# Patient Record
Sex: FEMALE | Race: WHITE | Employment: FULL TIME | URBAN - METROPOLITAN AREA
[De-identification: names, ages, dates, MRNs, and addresses within clinical notes are randomized per-mention and may not be internally consistent; named-entity substitution may affect disease eponyms.]

---

## 2022-10-31 ENCOUNTER — HOSPITAL ENCOUNTER (EMERGENCY)
Dept: GENERAL RADIOLOGY | Age: 67
Discharge: HOME | End: 2022-11-03
Payer: MEDICARE

## 2022-10-31 ENCOUNTER — HOSPITAL ENCOUNTER (EMERGENCY)
Age: 67
Discharge: HOME | End: 2022-10-31
Attending: EMERGENCY MEDICINE
Payer: MEDICARE

## 2022-10-31 VITALS
HEIGHT: 67 IN | DIASTOLIC BLOOD PRESSURE: 98 MMHG | SYSTOLIC BLOOD PRESSURE: 170 MMHG | OXYGEN SATURATION: 100 % | TEMPERATURE: 99.2 F | HEART RATE: 93 BPM | BODY MASS INDEX: 37.35 KG/M2 | WEIGHT: 238 LBS | RESPIRATION RATE: 18 BRPM

## 2022-10-31 DIAGNOSIS — S89.91XA RIGHT KNEE INJURY, INITIAL ENCOUNTER: Primary | ICD-10-CM

## 2022-10-31 PROCEDURE — 73562 X-RAY EXAM OF KNEE 3: CPT

## 2022-10-31 PROCEDURE — 99283 EMERGENCY DEPT VISIT LOW MDM: CPT

## 2022-10-31 ASSESSMENT — PAIN SCALES - GENERAL: PAINLEVEL_OUTOF10: 5

## 2022-10-31 ASSESSMENT — PAIN - FUNCTIONAL ASSESSMENT: PAIN_FUNCTIONAL_ASSESSMENT: 0-10

## 2022-10-31 NOTE — ED PROVIDER NOTES
Emergency Department Provider Note                   PCP:                None Provider               Age: 78 y.o. Sex: female       ICD-10-CM    1. Right knee injury, initial encounter  S89. 91XA           DISPOSITION Decision To Discharge 10/31/2022 03:08:34 PM       MDM  Number of Diagnoses or Management Options  Right knee injury, initial encounter: new, needed workup  Diagnosis management comments: Right knee pain 2 weeks after a twisting injury  Xray right knee with degenerative changes - I viewed images  Knee immobilizer  Ibuprofen or Tylenol as needed  Follow up with Orthopedics for further evaluation  Return with new or worse symptoms. Amount and/or Complexity of Data Reviewed  Tests in the radiology section of CPT®: ordered and reviewed               Orders Placed This Encounter   Procedures    XR KNEE RIGHT (3 VIEWS)    [facility] Knee Immobilizer, Right; 14\"        Medications - No data to display    New Prescriptions    No medications on file        Adrianna Dudley is a 78 y.o. female who presents to the Emergency Department with chief complaint of    Chief Complaint   Patient presents with    Knee Pain      61-year-old female complains of right knee pain. She states that she twisted the right knee while walking 2 weeks ago. She did not fall down. She has been treating it with ibuprofen and Tylenol. She has had no fever. No prior history of knee problems. She has been using heat and cold on the knee. She states that she did feel a pop. Pain became worse today. She is having to hold onto things as she ambulates secondary to the discomfort and feeling like it might be unstable. All other systems reviewed and are negative unless otherwise stated in the history of present illness section. Review of Systems   Constitutional:  Negative for chills and fever. Cardiovascular:  Positive for leg swelling.    Musculoskeletal:  Positive for arthralgias, gait problem and joint swelling. Skin: Negative. Neurological:  Negative for weakness and numbness. No past medical history on file. No past surgical history on file. No family history on file. Social History     Socioeconomic History    Marital status:         Allergies: Patient has no known allergies. Previous Medications    No medications on file        Vitals signs and nursing note reviewed. Patient Vitals for the past 4 hrs:   Temp Pulse Resp BP SpO2   10/31/22 1404 -- -- 18 (!) 190/89 100 %   10/31/22 1403 99.2 °F (37.3 °C) 93 16 -- --          Physical Exam  Constitutional:       Appearance: She is obese. Musculoskeletal:      Comments: Right knee with no redness or swelling. No instability. Tenderness to palpation with greatest area of tenderness at the lateral joint line. No increased warmth to touch. NV intact. Skin:     General: Skin is warm and dry. Neurological:      General: No focal deficit present. Mental Status: She is alert and oriented to person, place, and time. Psychiatric:         Mood and Affect: Mood normal.        Procedures    Results for orders placed or performed during the hospital encounter of 10/31/22   XR KNEE RIGHT (3 VIEWS)    Narrative    Right Knee    INDICATION: Right knee pain. Three views of the right knee were obtained    FINDINGS:  No evidence of acute fracture or dislocation. Chondrocalcinosis  involving the medial and lateral meniscus is noted. Severe patellofemoral joint  space narrowing with osteophytes is noted. Mild medial and lateral joint  compartment narrowing is also noted. Impression    No evidence of acute fracture or dislocation. Degenerative right  knee joint changes with the worst involving the patellofemoral joint space. XR KNEE RIGHT (3 VIEWS)   Final Result   No evidence of acute fracture or dislocation. Degenerative right   knee joint changes with the worst involving the patellofemoral joint space. Voice dictation software was used during the making of this note. This software is not perfect and grammatical and other typographical errors may be present. This note has not been completely proofread for errors.      Lara Augustine MD  10/31/22 3229

## 2022-10-31 NOTE — ED TRIAGE NOTES
Pt arrives in [facility] stating right knee pain x 2 weeks . Pt states she heard a \"pop\" and is unable to bear weight on that right knee.

## 2022-10-31 NOTE — ED NOTES
I have reviewed discharge instructions with the patient. The patient verbalized understanding. Patient left ED via Discharge Method: wheelchair to Home with self. Opportunity for questions and clarification provided. Patient given 0 scripts. To continue your aftercare when you leave the hospital, you may receive an automated call from our care team to check in on how you are doing. This is a free service and part of our promise to provide the best care and service to meet your aftercare needs.  If you have questions, or wish to unsubscribe from this service please call [phone]. Thank you for Choosing our [facility] Emergency Department.         Villa Harris RN  10/31/22 1614

## 2022-10-31 NOTE — DISCHARGE INSTRUCTIONS
You may take up to 800 mg Ibuprofen three times a day as needed for pain. You may take up to 1000 mg Acetaminophen four times a day as needed for pain. May use ice or heat as needed. Wear knee immobilizer while walking. Follow up with Orthopedic surgery for further evaluation.
WDL

## 2022-10-31 NOTE — ED NOTES
Did not have 14\" knee immobilizer in stock.  MD okayed 16\" immobilizer      Kenton Zimmerman RN  10/31/22 5905

## 2022-11-07 ENCOUNTER — OFFICE VISIT (OUTPATIENT)
Dept: ORTHOPEDIC SURGERY | Age: 67
End: 2022-11-07
Payer: MEDICARE

## 2022-11-07 DIAGNOSIS — M17.11 PRIMARY OSTEOARTHRITIS OF RIGHT KNEE: Primary | ICD-10-CM

## 2022-11-07 PROCEDURE — G8400 PT W/DXA NO RESULTS DOC: HCPCS | Performed by: ORTHOPAEDIC SURGERY

## 2022-11-07 PROCEDURE — G8484 FLU IMMUNIZE NO ADMIN: HCPCS | Performed by: ORTHOPAEDIC SURGERY

## 2022-11-07 PROCEDURE — 1090F PRES/ABSN URINE INCON ASSESS: CPT | Performed by: ORTHOPAEDIC SURGERY

## 2022-11-07 PROCEDURE — 4004F PT TOBACCO SCREEN RCVD TLK: CPT | Performed by: ORTHOPAEDIC SURGERY

## 2022-11-07 PROCEDURE — 3017F COLORECTAL CA SCREEN DOC REV: CPT | Performed by: ORTHOPAEDIC SURGERY

## 2022-11-07 PROCEDURE — 99204 OFFICE O/P NEW MOD 45 MIN: CPT | Performed by: ORTHOPAEDIC SURGERY

## 2022-11-07 PROCEDURE — 20610 DRAIN/INJ JOINT/BURSA W/O US: CPT | Performed by: ORTHOPAEDIC SURGERY

## 2022-11-07 PROCEDURE — 1123F ACP DISCUSS/DSCN MKR DOCD: CPT | Performed by: ORTHOPAEDIC SURGERY

## 2022-11-07 PROCEDURE — G8428 CUR MEDS NOT DOCUMENT: HCPCS | Performed by: ORTHOPAEDIC SURGERY

## 2022-11-07 PROCEDURE — G8417 CALC BMI ABV UP PARAM F/U: HCPCS | Performed by: ORTHOPAEDIC SURGERY

## 2022-11-07 RX ORDER — METHYLPREDNISOLONE ACETATE 40 MG/ML
40 INJECTION, SUSPENSION INTRA-ARTICULAR; INTRALESIONAL; INTRAMUSCULAR; SOFT TISSUE ONCE
Status: COMPLETED | OUTPATIENT
Start: 2022-11-07 | End: 2022-11-07

## 2022-11-07 RX ADMIN — METHYLPREDNISOLONE ACETATE 40 MG: 40 INJECTION, SUSPENSION INTRA-ARTICULAR; INTRALESIONAL; INTRAMUSCULAR; SOFT TISSUE at 15:58

## 2022-11-07 NOTE — PROGRESS NOTES
Name: Roselia Phan  YOB: 1955  Gender: female  MRN: [id number]    CC: Right knee pain    HPI: Roselia Phan is a 78 y.o. female who presents with an episode of significant increase of right knee pain. She states has had issues off and on with her right knee but it has usually settled down with rest and time. In early September she twisted her knee and felt a significant increase in pain and she had swelling accompanying that. 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 Her  around [facility] had significant issues with a kidney stone requiring a hospitalization at [address] [place] [address]. During that time she had x-rays of her right knee done and had an ER evaluation. She did also obtain a cane and has used that to limit her weightbearing on the right and has been resting the knee. Her pain and swelling is improved we will continue symptomatology comes in today for further recommendations and treatment. History was obtained from patient and spouse    ROS/Meds/PSH/PMH/FH/SH: I personally reviewed the patients standard intake form. Below are the pertinents    Tobacco:  has no history on file for tobacco use. No past medical history on file. No past surgical history on file. Physical Examination:  Physical exam: On examination of the patient's gait there is  no obvious antalgia when she is standing. She does require the use of her hands to get to a standing position from her chair. She is using a 4 pronged cane in the left hand appropriately. On examination while standing there is decreased flexion in the lumbosacral spine without acute list or spasm. While seated ,  the Bilateral hip is examined there is full range of motion without obvious issue .      On examination of the  Right knee :ROM is 0 to 125 There is significant tenderness to direct palpation, There is a mild effusion, and there is some crepitation to the flexion arc of the patellofemoral region but no significant angular malalignment is evident today. . On examination of the  Left knee :ROM is 0 to 125 There is full range of motion without obvious instability. Patient is neurologically intact distally. Skin is intact. Imaging:   AP lateral and oblique of the right knee is independently reviewed by me today from images obtained in [facility] on October 31, 2022. This demonstrates there is well-maintained tibiofemoral joint space in these nonweightbearing films but she does have significant patellofemoral joint space narrowing on the lateral view. Radiographic impression: Patellofemoral arthritis right knee    Assessment:   Degenerative joint disease of the right Knee. I did discuss with the patient the source of the pain and treatment options. We discussed nonsurgical measures including:Injection therapy, Bracing, Weight Loss, Activity Modification, and Use of assistive device. I counseled the patient regarding the difference between various injection therapies. I explained the role of steroid injections and focused on the use of steroids for more acute issues and flareups of arthritic and other concerns within the joint. Also counseled patient regarding the role of viscosupplementation. I counseled them about the use of viscosupplementation in more moderate issues and more chronic problems. Counseled about the way in which would be administered in the expectation in terms of outcome. We talked about the risks of injection therapy with viscosupplementation. We also discussed the definitive option of total Knee arthroplasty. I do not think she is at the point of considering such an intervention at this time. We did discuss the natural history of arthritis and the potential there. I have offered her a cortisone injection today-please see note below.   We talked about modification of activities and hopefully these measures will help. Plan:       Follow up:   Return for Set Up Eufe[facility] 3 Visits. Shanika Gardner MD      Name: Brayan Mendenhall  YOB: 1955  Gender: female  MRN: [id number]      No current outpatient medications on file. No Known Allergies    CC:right knee pain    Impression: Osteoarthritis of the right knee    Procedure: Depomedrol injection     Procedure Note: The right knee was prepped with alcohol. Then the right knee was injected with 1 mL of 0.5% Marcaine and 40mg of depomedrol The patient tolerated the procedure without difficulty.       Shanika Gardner MD

## 2022-12-05 ENCOUNTER — OFFICE VISIT (OUTPATIENT)
Dept: ORTHOPEDIC SURGERY | Age: 67
End: 2022-12-05
Payer: MEDICARE

## 2022-12-05 DIAGNOSIS — M17.11 PRIMARY OSTEOARTHRITIS OF RIGHT KNEE: Primary | ICD-10-CM

## 2022-12-05 NOTE — PROGRESS NOTES
Name: Nilsa Emerson  YOB: 1955  Gender: female  MRN: [id number]     CC: RIGHT Knee Osteoarthritis      PROCEDURE: #1 of 3 Hyaluronic Injection    After discussion of risks and benefits including but not limited to pain, infection, skin discoloration, and injury to blood vessels or nerves the patient verbally consented to proceed with injection of the RIGHT. The patient is to restrict their activity for 48 hours. Radiology Report: US guidance was used to examine the joint, ensure adequate needle placement and to decrease the risk of joint aggravation. The intracondylar notch, retropatellar fat pad, patella tendon, patella and tibia were all visualized. Pre and post injection US still images were obtained and placed in the record. Image were obtained with a Signal360 (formerly Sonic Notify) ultrasound transducer (model [id number]). Procedure Note: After steriley prepping the RIGHT knee, it was injected with a 2mL of Synvisc and the medication was observed going into the intra-articular space via US guidance. The patient tolerated the procedure without difficulty.     STAR Raya

## 2022-12-12 ENCOUNTER — OFFICE VISIT (OUTPATIENT)
Dept: ORTHOPEDIC SURGERY | Age: 67
End: 2022-12-12
Payer: MEDICARE

## 2022-12-12 DIAGNOSIS — M25.559 HIP PAIN: ICD-10-CM

## 2022-12-12 DIAGNOSIS — M17.11 PRIMARY OSTEOARTHRITIS OF RIGHT KNEE: Primary | ICD-10-CM

## 2022-12-12 DIAGNOSIS — M54.50 ACUTE LOW BACK PAIN, UNSPECIFIED BACK PAIN LATERALITY, UNSPECIFIED WHETHER SCIATICA PRESENT: ICD-10-CM

## 2022-12-12 PROCEDURE — 20611 DRAIN/INJ JOINT/BURSA W/US: CPT | Performed by: PHYSICIAN ASSISTANT

## 2022-12-12 NOTE — PROGRESS NOTES
Name: Denisse Hayward  YOB: 1955  Gender: female  MRN: [id number]     CC: RIGHT Knee Osteoarthritis      PROCEDURE: #2 of 3 Hyaluronic Injection    After discussion of risks and benefits including but not limited to pain, infection, skin discoloration, and injury to blood vessels or nerves the patient verbally consented to proceed with injection of the RIGHT. The patient is to restrict their activity for 48 hours. Radiology Report: US guidance was used to examine the joint, ensure adequate needle placement and to decrease the risk of joint aggravation. The intracondylar notch, retropatellar fat pad, patella tendon, patella and tibia were all visualized. Pre and post injection US still images were obtained and placed in the record. Image were obtained with a Boutir ultrasound transducer (model [id number]). Procedure Note: After steriley prepping the RIGHT knee, it was injected with a 2mL of Synvisc and the medication was observed going into the intra-articular space via US guidance. The patient tolerated the procedure without difficulty.     STAR Dickson

## 2022-12-21 ENCOUNTER — OFFICE VISIT (OUTPATIENT)
Dept: ORTHOPEDIC SURGERY | Age: 67
End: 2022-12-21

## 2022-12-21 DIAGNOSIS — M17.11 PRIMARY OSTEOARTHRITIS OF RIGHT KNEE: Primary | ICD-10-CM

## 2022-12-21 NOTE — PROGRESS NOTES
Name: Boo Dunbar  YOB: 1955  Gender: female  MRN: [id number]     CC: RIGHT Knee Osteoarthritis      PROCEDURE: #3 of 3 Hyaluronic Injection    After discussion of risks and benefits including but not limited to pain, infection, skin discoloration, and injury to blood vessels or nerves the patient verbally consented to proceed with injection of the RIGHT. The patient is to restrict their activity for 48 hours. Radiology Report: US guidance was used to examine the joint, ensure adequate needle placement and to decrease the risk of joint aggravation. The intracondylar notch, retropatellar fat pad, patella tendon, patella and tibia were all visualized. Pre and post injection US still images were obtained and placed in the record. Image were obtained with a [employer]ameglio.it ultrasound transducer (model [id number]). Procedure Note: After steriley prepping the RIGHT knee, it was injected with a 2mL of Synvisc and the medication was observed going into the intra-articular space via US guidance. The patient tolerated the procedure without difficulty.     STAR Charles

## 2024-09-04 ENCOUNTER — TELEPHONE (OUTPATIENT)
Dept: OTHER | Facility: CLINIC | Age: 69
End: 2024-09-04

## 2025-02-25 ENCOUNTER — PATIENT MESSAGE (OUTPATIENT)
Dept: INTERNAL MEDICINE CLINIC | Facility: CLINIC | Age: 70
End: 2025-02-25

## 2025-06-04 ENCOUNTER — OFFICE VISIT (OUTPATIENT)
Dept: FAMILY MEDICINE CLINIC | Facility: CLINIC | Age: 70
End: 2025-06-04
Payer: MEDICARE

## 2025-06-04 VITALS
RESPIRATION RATE: 16 BRPM | OXYGEN SATURATION: 96 % | SYSTOLIC BLOOD PRESSURE: 128 MMHG | BODY MASS INDEX: 34.72 KG/M2 | DIASTOLIC BLOOD PRESSURE: 66 MMHG | HEART RATE: 72 BPM | HEIGHT: 66 IN | WEIGHT: 216 LBS | TEMPERATURE: 98.4 F

## 2025-06-04 DIAGNOSIS — Z12.11 COLON CANCER SCREENING: ICD-10-CM

## 2025-06-04 DIAGNOSIS — G25.0 ESSENTIAL TREMOR: Primary | ICD-10-CM

## 2025-06-04 DIAGNOSIS — Z78.0 ASYMPTOMATIC MENOPAUSE: ICD-10-CM

## 2025-06-04 DIAGNOSIS — Z76.89 ESTABLISHING CARE WITH NEW DOCTOR, ENCOUNTER FOR: ICD-10-CM

## 2025-06-04 DIAGNOSIS — M19.90 ARTHRITIS: ICD-10-CM

## 2025-06-04 DIAGNOSIS — Z12.31 ENCOUNTER FOR SCREENING MAMMOGRAM FOR HIGH-RISK PATIENT: ICD-10-CM

## 2025-06-04 DIAGNOSIS — Z13.220 ENCOUNTER FOR LIPID SCREENING FOR CARDIOVASCULAR DISEASE: ICD-10-CM

## 2025-06-04 DIAGNOSIS — Z13.31 NEGATIVE DEPRESSION SCREENING: ICD-10-CM

## 2025-06-04 DIAGNOSIS — E66.09 CLASS 1 OBESITY DUE TO EXCESS CALORIES WITH SERIOUS COMORBIDITY AND BODY MASS INDEX (BMI) OF 34.0 TO 34.9 IN ADULT: ICD-10-CM

## 2025-06-04 DIAGNOSIS — Z13.1 DIABETES MELLITUS SCREENING: ICD-10-CM

## 2025-06-04 DIAGNOSIS — E66.811 CLASS 1 OBESITY DUE TO EXCESS CALORIES WITH SERIOUS COMORBIDITY AND BODY MASS INDEX (BMI) OF 34.0 TO 34.9 IN ADULT: ICD-10-CM

## 2025-06-04 DIAGNOSIS — Z82.0 FAMILY HISTORY OF PARKINSON DISEASE: ICD-10-CM

## 2025-06-04 DIAGNOSIS — Z13.6 ENCOUNTER FOR LIPID SCREENING FOR CARDIOVASCULAR DISEASE: ICD-10-CM

## 2025-06-04 LAB
25(OH)D3 SERPL-MCNC: 22.9 NG/ML (ref 30–100)
ALBUMIN SERPL-MCNC: 3.8 G/DL (ref 3.2–4.6)
ALBUMIN/GLOB SERPL: 1.1 (ref 1–1.9)
ALP SERPL-CCNC: 66 U/L (ref 35–104)
ALT SERPL-CCNC: 19 U/L (ref 8–45)
ANION GAP SERPL CALC-SCNC: 10 MMOL/L (ref 7–16)
AST SERPL-CCNC: 29 U/L (ref 15–37)
BASOPHILS # BLD: 0.05 K/UL (ref 0–0.2)
BASOPHILS NFR BLD: 0.7 % (ref 0–2)
BILIRUB SERPL-MCNC: 0.6 MG/DL (ref 0–1.2)
BUN SERPL-MCNC: 16 MG/DL (ref 8–23)
CALCIUM SERPL-MCNC: 10.1 MG/DL (ref 8.8–10.2)
CHLORIDE SERPL-SCNC: 105 MMOL/L (ref 98–107)
CHOLEST SERPL-MCNC: 217 MG/DL (ref 0–200)
CO2 SERPL-SCNC: 26 MMOL/L (ref 20–29)
CREAT SERPL-MCNC: 0.92 MG/DL (ref 0.6–1.1)
DIFFERENTIAL METHOD BLD: NORMAL
EOSINOPHIL # BLD: 0.24 K/UL (ref 0–0.8)
EOSINOPHIL NFR BLD: 3.3 % (ref 0.5–7.8)
ERYTHROCYTE [DISTWIDTH] IN BLOOD BY AUTOMATED COUNT: 13.1 % (ref 11.9–14.6)
GLOBULIN SER CALC-MCNC: 3.4 G/DL (ref 2.3–3.5)
GLUCOSE SERPL-MCNC: 90 MG/DL (ref 70–99)
HCT VFR BLD AUTO: 46 % (ref 35.8–46.3)
HDLC SERPL-MCNC: 42 MG/DL (ref 40–60)
HDLC SERPL: 5.1 (ref 0–5)
HGB BLD-MCNC: 14.8 G/DL (ref 11.7–15.4)
IMM GRANULOCYTES # BLD AUTO: 0.03 K/UL (ref 0–0.5)
IMM GRANULOCYTES NFR BLD AUTO: 0.4 % (ref 0–5)
LDLC SERPL CALC-MCNC: 144 MG/DL (ref 0–100)
LYMPHOCYTES # BLD: 2.69 K/UL (ref 0.5–4.6)
LYMPHOCYTES NFR BLD: 36.6 % (ref 13–44)
MCH RBC QN AUTO: 30.5 PG (ref 26.1–32.9)
MCHC RBC AUTO-ENTMCNC: 32.2 G/DL (ref 31.4–35)
MCV RBC AUTO: 94.8 FL (ref 82–102)
MONOCYTES # BLD: 0.69 K/UL (ref 0.1–1.3)
MONOCYTES NFR BLD: 9.4 % (ref 4–12)
NEUTS SEG # BLD: 3.64 K/UL (ref 1.7–8.2)
NEUTS SEG NFR BLD: 49.6 % (ref 43–78)
NRBC # BLD: 0 K/UL (ref 0–0.2)
PLATELET # BLD AUTO: 227 K/UL (ref 150–450)
PMV BLD AUTO: 10.2 FL (ref 9.4–12.3)
POTASSIUM SERPL-SCNC: 4.6 MMOL/L (ref 3.5–5.1)
PROT SERPL-MCNC: 7.3 G/DL (ref 6.3–8.2)
RBC # BLD AUTO: 4.85 M/UL (ref 4.05–5.2)
SODIUM SERPL-SCNC: 141 MMOL/L (ref 136–145)
TRIGL SERPL-MCNC: 154 MG/DL (ref 0–150)
TSH W FREE THYROID IF ABNORMAL: 1.52 UIU/ML (ref 0.27–4.2)
VLDLC SERPL CALC-MCNC: 31 MG/DL (ref 6–23)
WBC # BLD AUTO: 7.3 K/UL (ref 4.3–11.1)

## 2025-06-04 PROCEDURE — 99204 OFFICE O/P NEW MOD 45 MIN: CPT

## 2025-06-04 PROCEDURE — 1126F AMNT PAIN NOTED NONE PRSNT: CPT

## 2025-06-04 PROCEDURE — 1159F MED LIST DOCD IN RCRD: CPT

## 2025-06-04 PROCEDURE — 1160F RVW MEDS BY RX/DR IN RCRD: CPT

## 2025-06-04 PROCEDURE — 1123F ACP DISCUSS/DSCN MKR DOCD: CPT

## 2025-06-04 ASSESSMENT — PATIENT HEALTH QUESTIONNAIRE - PHQ9
SUM OF ALL RESPONSES TO PHQ QUESTIONS 1-9: 0
1. LITTLE INTEREST OR PLEASURE IN DOING THINGS: NOT AT ALL
2. FEELING DOWN, DEPRESSED OR HOPELESS: NOT AT ALL
SUM OF ALL RESPONSES TO PHQ QUESTIONS 1-9: 0

## 2025-06-04 NOTE — PATIENT INSTRUCTIONS
Patient Education        Live Zoster (Shingles) Vaccine: What You Need to Know  Why get vaccinated?  Live zoster (shingles) vaccine can prevent shingles.  Shingles (also called herpes zoster, or just zoster) is a painful skin rash, usually with blisters. In addition to the rash, shingles can cause fever, headache, chills, or upset stomach. More rarely, shingles can lead to pneumonia, hearing problems, blindness, brain inflammation (encephalitis), or death.  The most common complication of shingles is long-term nerve pain called postherpetic neuralgia (PHN). PHN occurs in the areas where the shingles rash was, even after the rash clears up. It can last for months or years after the rash goes away. The pain from PHN can be severe and debilitating.  About 10 to 18% of people who get shingles will experience PHN. The risk of PHN increases with age. An older adult with shingles is more likely to develop PHN and have longer lasting and more severe pain than a younger person with shingles.  Shingles is caused by the varicella zoster virus, the same virus that causes chickenpox. After you have chickenpox, the virus stays in your body and can cause shingles later in life. Shingles cannot be passed from one person to another, but the virus that causes shingles can spread and cause chickenpox in someone who had never had chickenpox or received chickenpox vaccine.  Live shingles vaccine  Live shingles vaccine can provide protection against shingles and PHN.  Another type of shingles vaccine, recombinant shingles vaccine, is the preferred vaccine for the prevention of shingles. However, live shingles vaccine may be used in some circumstances (for example if a person is allergic to recombinant shingles vaccine or prefers live shingles vaccine, or if recombinant shingles vaccine is not available).  Adults 60 years and older who get live shingles vaccine should receive 1 dose, administered by injection.  Shingles vaccine may be

## 2025-06-04 NOTE — PROGRESS NOTES
[facility] [place] [address]  Phone: [phone] Fax [phone]  Monse Eden MD      Date of Service: 6/4/2025    Patient: Cee Lynn  1955 70 y.o. female,Established patient, here for evaluation of the following chief complaint(s):      Chief complaint:   Chief Complaint   Patient presents with    New Patient       HISTORY OF PRESENTING ILLNESS     Cee Lynn is a 70 y.o. female presented to the clinic to establish with me. Patient is here to establish care-patient's medical, family, surgical, social history were reviewed.      History of Present Illness   She reports feeling well overall but has been experiencing intermittent swelling in her foot, OA and tremors. She reports no nausea, vomiting, diarrhea, chest pain, shortness of breath, fevers, or chills.    She has a family history of arthritis and has been managing her knee pain with Tylenol. She has not tried turmeric for her arthritis. She has received injections in her right knee, which provided temporary relief. She has not tried any topical treatments for her knee pain. She has lost weight over the past several months through increased activity and dietary changes. 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 She has been attending cardiotherapy sessions and has taken a break from her kitchen work, which has alleviated some of her knee pain.    She also experiences essential tremors in her arms and hands, which have worsened over the past few years. She has not taken any medication for this condition. She reports no other symptoms related to Parkinson's disease, such as a shuffling gait or mood changes. Her tremors worsen when she is tired or stressed, making it difficult for her to carry items such as paper plates with food.    She has not undergone a DEXA scan. She had a colonoscopy several years ago due to diverticulitis, which

## 2025-06-05 ENCOUNTER — RESULTS FOLLOW-UP (OUTPATIENT)
Dept: FAMILY MEDICINE CLINIC | Facility: CLINIC | Age: 70
End: 2025-06-05

## 2025-06-05 RX ORDER — ERGOCALCIFEROL 1.25 MG/1
50000 CAPSULE, LIQUID FILLED ORAL WEEKLY
Qty: 12 CAPSULE | Refills: 1 | Status: SHIPPED | OUTPATIENT
Start: 2025-06-05

## 2025-06-05 NOTE — RESULT ENCOUNTER NOTE
Please notify Cee Briana Daviddonovan , that she has an abnormal result:     Patient's cholesterol and triglycerides are both elevated, she will benefit from omega-3 supplements to lower triglycerides and a statin medication however if she would like to continue to monitor for 3 months with extensive lifestyle modifications we can discuss about starting a statin medication at the next appointment.  Please encourage patient to continue with plan as discussed in the office, please continue to increase physical activity as able and decrease sweets, carbs, high cholesterol containing foods in the diet. She has vit d deficiency please start 59298 units vit D once weekly, it has been ordered to the pharmacy.     Otherwise her thyroid liver kidney and blood cell counts are all normal.    The 10-year ASCVD risk score (Bryant DK, et al., 2019) is: 9.3%    Values used to calculate the score:      Age: 70 years      Sex: Female      Is Non- : No      Diabetic: No      Tobacco smoker: No      Systolic Blood Pressure: 128 mmHg      Is BP treated: No      HDL Cholesterol: 42 MG/DL      Total Cholesterol: 217 MG/DL    ~ Thank you